# Patient Record
Sex: MALE | URBAN - METROPOLITAN AREA
[De-identification: names, ages, dates, MRNs, and addresses within clinical notes are randomized per-mention and may not be internally consistent; named-entity substitution may affect disease eponyms.]

---

## 2023-10-24 ENCOUNTER — IMMUNIZATION (OUTPATIENT)
Age: 73
End: 2023-10-24

## 2023-10-24 DIAGNOSIS — Z23 ENCOUNTER FOR IMMUNIZATION: Primary | ICD-10-CM

## 2023-10-24 NOTE — PROGRESS NOTES
Patient requests flu vaccine; consent form obtained; denies fever, egg allergy nor past reactions to any injection or immunization. Immunization given per protocol and recorded in [address] [place]. VIS information sheet given, explained possible S/E. Reviewed sx indicating need to be seen in ER. Pt had no adverse reaction at time of discharge. Flu shot given by MISHA Bello RN on 10/22/23  Alissa Vasquez RN